# Patient Record
Sex: FEMALE | Race: WHITE | NOT HISPANIC OR LATINO | URBAN - NONMETROPOLITAN AREA
[De-identification: names, ages, dates, MRNs, and addresses within clinical notes are randomized per-mention and may not be internally consistent; named-entity substitution may affect disease eponyms.]

---

## 2019-10-25 ENCOUNTER — IMPORTED ENCOUNTER (OUTPATIENT)
Dept: URBAN - NONMETROPOLITAN AREA CLINIC 1 | Facility: CLINIC | Age: 84
End: 2019-10-25

## 2019-10-25 PROCEDURE — 92012 INTRM OPH EXAM EST PATIENT: CPT

## 2019-10-25 NOTE — PATIENT DISCUSSION
Eyelid cyst LLL - discussed. MOS if desires. IOLs OU - doing well. Prefers nvo's. Blepharitis-Discussed with pt.-Recommend pt begin warm compresses and lid scrubs BID. Pt instructed on proper lid scrub technique. -Recommend artificial tears OU QID PRN. cont cipro qid ou x 1wk then use 1 gtt qhs oumay change to maxitrol if desires; Dr's Notes: Former  from [place]. Daughter Malgorzata Younger. Just trimmed [address].

## 2019-11-21 ENCOUNTER — IMPORTED ENCOUNTER (OUTPATIENT)
Dept: URBAN - NONMETROPOLITAN AREA CLINIC 1 | Facility: CLINIC | Age: 84
End: 2019-11-21

## 2019-11-21 PROCEDURE — 92012 INTRM OPH EXAM EST PATIENT: CPT

## 2019-11-21 NOTE — PATIENT DISCUSSION
Eyelid cyst LLL - discussed. MOS if desires. IOLs OU - doing well. Prefers nvo's. Blepharitis-Discussed with pt.-Recommend pt begin warm compresses and lid scrubs BID. Pt instructed on proper lid scrub technique. -Recommend artificial tears OU QID PRN. start lid huygeinestart tobradex tid ou x 30 daysstart doxy 100 mg qd po x 30 daysrtc prn; Dr's Notes: Former  from [place]. Daughter Ricki Lopez. Just trimmed [address].

## 2022-04-09 ASSESSMENT — VISUAL ACUITY
OS_CC: 20/25
OD_CC: 20/30
OD_CC: 20/40-2
OS_CC: 20/40+1